# Patient Record
Sex: FEMALE | Race: WHITE | URBAN - METROPOLITAN AREA
[De-identification: names, ages, dates, MRNs, and addresses within clinical notes are randomized per-mention and may not be internally consistent; named-entity substitution may affect disease eponyms.]

---

## 2020-01-23 ENCOUNTER — OFFICE VISIT (OUTPATIENT)
Dept: DERMATOLOGY | Age: 65
End: 2020-01-23
Payer: COMMERCIAL

## 2020-01-23 PROCEDURE — 99202 OFFICE O/P NEW SF 15 MIN: CPT | Performed by: DERMATOLOGY

## 2020-01-23 RX ORDER — LEVOTHYROXINE SODIUM 88 UG/1
88 TABLET ORAL
COMMUNITY
Start: 2016-01-15

## 2020-01-23 NOTE — PROGRESS NOTES
[facility]  Angela Scott MD  [phone]      Eulogio Savage  1955    71 y.o. female     Date of Visit: 1/23/2020    Chief Complaint: skin lesions    History of Present Illness:    1. She presents today for a stable asymptomatic lesion on the nasal tip. 2.  She also reports multiple nevi in the trunk and extremities-not aware of any changes in size, color, shape. 3.  She also complains of numerous red moles on the trunk. 4.  She reports a longstanding history of asymptomatic lesions on the extremities. No family history of skin cancer. Had a fair amount of sun exposure in childhood. Tans easily. Alec Dodson is her . Review of Systems:  Skin: No rash or other concerning skin lesions. Past Medical History, Family History, Surgical History, Medications and Allergies reviewed. History reviewed. No pertinent past medical history. History reviewed. No pertinent surgical history. 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 Physical Examination       The following were examined and determined to be normal: Psych/Neuro, Scalp/hair, Head/face, Conjunctivae/eyelids, Gums/teeth/lips, Neck, Breast/axilla/chest, Abdomen, Back, RUE, LUE, RLE, LLE and Nails/digits. The following were examined and determined to be abnormal: none. Well appearing. 1.  Nasal tip - small smooth pink papule.      2.  Trunk and extremities with multiple well defined round to oval smooth brown macules and papules. 3.  Scalp, trunk and extremities - multiple scattered smooth brightly erythematous papules. 4.  Arms and thighs with multiple soft subcutaneous tumors. Assessment and Plan     1. Fibrous papule of nose     Reassurance. 2. Multiple nevi - benign appearing    Sun protective behaviors and self skin examinations were encouraged. Call for any new or concerning lesions. 3. Cherry angiomas    Reassurance. 4. Multiple lipomas - none are painful    Reassurance. Consider excision of any symptomatic lesions. Return in about 2 years (around 1/23/2022).

## 2024-04-03 ENCOUNTER — OFFICE VISIT (OUTPATIENT)
Dept: DERMATOLOGY | Age: 69
End: 2024-04-03
Payer: COMMERCIAL

## 2024-04-03 DIAGNOSIS — D22.9 MULTIPLE NEVI: Primary | ICD-10-CM

## 2024-04-03 DIAGNOSIS — D17.9 MULTIPLE LIPOMAS: ICD-10-CM

## 2024-04-03 PROCEDURE — 1123F ACP DISCUSS/DSCN MKR DOCD: CPT | Performed by: DERMATOLOGY

## 2024-04-03 PROCEDURE — 99213 OFFICE O/P EST LOW 20 MIN: CPT | Performed by: DERMATOLOGY

## 2024-04-03 NOTE — PROGRESS NOTES
[facility]  Jaxon Constantino MD  [phone]      Eileen Plascencia  1955    68 y.o. female     Date of Visit: 4/3/2024    Chief Complaint: skin lesions    History of Present Illness:    1.  She presents today for evaluation of multiple moles on the trunk and extremities-not aware of any new or changing lesions.    2.  She has multiple asymptomatic fatty tumors on the skin of the extremities.    No family history of skin cancer.   Had a fair amount of sun exposure in childhood.   Tans easily.      Robb Plascencia is her .      Review of Systems:  Gen: Feels well, good sense of health.    Past Medical History, Family History, Surgical History, Medications and Allergies reviewed.    History reviewed. No pertinent past medical history.  History reviewed. No pertinent surgical history.    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        Physical Examination       The following were examined and determined to be normal: Psych/Neuro, Scalp/hair, Head/face, Conjunctivae/eyelids, Gums/teeth/lips, Neck, Breast/axilla/chest, Abdomen, Back, RUE, LUE, RLE, LLE, and Nails/digits.    The following were examined and determined to be abnormal: None.     Well-appearing.    1.  Scattered on the trunk and extremities are multiple well-defined round of uniformly brown macules and papules.    2.  Extremities with several small scattered subcutaneous soft tumors.        Assessment and Plan     1. Multiple nevi - benign appearing    Sun protective behaviors, including use of at least SPF 30